# Patient Record
(demographics unavailable — no encounter records)

---

## 2025-05-08 NOTE — PHYSICAL EXAM
[Well Developed] : well developed [No Acute Distress] : no acute distress [No Carotid Bruit] : no carotid bruit [Normal S1, S2] : normal S1, S2 [No Murmur] : no murmur [Clear Lung Fields] : clear lung fields [No Respiratory Distress] : no respiratory distress  [Normal Gait] : normal gait [No Edema] : no edema [Normal Radial B/L] : normal radial B/L [Moves all extremities] : moves all extremities [No Focal Deficits] : no focal deficits [Alert and Oriented] : alert and oriented

## 2025-05-08 NOTE — DISCUSSION/SUMMARY
[FreeTextEntry1] : OLGA DAVE is a 66 year old M who presents today May 08, 2025 with the above history and the following active issues:   Dyspnea: Recommend updating Echocardiogram for evaluation of cardiac anatomy and function including resting heart structure, valvular function, and LVEF.  Recommend Obtain exercise stress test to assess BP response to exercise, exercise induced arrhythmias, or evidence of ischemia.  F/u after testing.  Borderline HTN: Mildly elevated. ETT will be helpful for BP response to exercise.  HLD: LDL 96 in 9/2024. Compliant with Crestor.  Recommend CT Calcium score for risk stratification.  Updated fasting lab work has been requested. He will call for results.  F/u after above testing or sooner PRN.  Red flag symptoms which would warrant sooner emergent evaluation reviewed with the patient. All questions and concerns were addressed and answered.   Sincerely,  Nola Reyes, [facility] Patient's history, testing, medications and any relative changes in plan of care reviewed with supervising MD: Dr. Aba Villeda

## 2025-05-08 NOTE — CARDIOLOGY SUMMARY
[de-identified] : 11/2022 ETT Exercised 11mins 15sec on Temo protocol. Negative for ischemia.  [de-identified] : 11/2022 Normal left ventricular size, wall thickness, wall motion, and systolic function. Normal diastolic function.  [de-identified] : Labs 9/2024 LDL 96, HDL 58, Trigs 120.

## 2025-05-08 NOTE — HISTORY OF PRESENT ILLNESS
[FreeTextEntry1] : Zev is a 66yoM with PMHx: HLD, pre-DM, transient AST elevation, diverticulitis.  He was last seen 9/2024 feeling well, active without exertional sx.   He presents in clinic today for routine interval follow up. Since last seen he went on trip to [place], hiking, some DIEGO/SOB during that. No associated sx.  He remains active, exercising regularly, walking.   There is no exertional chest pain, pressure or discomfort. There is no LE edema, PND or orthopnea. There are no symptomatic palpitations, lightheadedness, dizziness or syncope.   BP is borderline at 134/82. Weight is stable. BMI 19.69kg/m2.   No recent lab work available for my review.  Prior testing outlined in cardiology summary.

## 2025-06-09 NOTE — DISCUSSION/SUMMARY
[FreeTextEntry1] : OLGA DAVE is a 66 year old M who presents today Jun 03, 2025 with the above history and the following active issues:  DIEGO. [place] ETT. Echo and CT calcium score with the above findings. Advised to f/u with PCP re: noncardiac findings. Recommend coronary CTA to r/o CAD. Denies allergy to IV dye/contrastst. Cr 0.69 on labs 5/9/25. On ASA/statin. F/u after to review.  F/u after testing.  Borderline HTN: /68 today.  HLD: on statin.   Ongoing f/u with PCP.  F/U after testing to review results. Discussed red flag symptoms, which would warrant sooner or emergent medical evaluation. Any questions and concerns were addressed and resolved.  Sincerely, Chelsi Sharp [facility] Patient's history, testing, and plan was reviewed with supervising physician, Dr. Cal Ross

## 2025-06-09 NOTE — HISTORY OF PRESENT ILLNESS
[FreeTextEntry1] : OLGA DAVE is a 66 year old male with a past medical history of  HLD, pre-DM, transient AST elevation, diverticulitis.  Last seen 5/8/25. DIEGO reported. CT calcium score, Echo, ETT, and labs ordered. See details below. In the interim there have been no hospitalizations or procedures. Reports DIEGO. Denies CP, palpitations, dizziness, lightheadedness, syncope, claudication, and edema. No smoking hx. Works as a lynn.  Testing:  ETT 6/3/25: Conclusions: 1. The ECG is nondiagnostic for Ischemia due to failure to achieve 85% maximum predicted heart rate. 2. Electrocardiogram ischemic changes: No evidence of exercise induced ischemia at sub max HR.  Echo 5/30/25: CONCLUSIONS: 1. The left ventricular diastolic function is indeterminate. 2. Mildly enlarged right ventricular cavity size, with normal wall thickness, and normal right ventricular  systolic function. 3. Normal left and right atrial size. 4. Mild mitral regurgitation. 5. No echocardiographic evidence of pulmonary hypertension. 6. No pericardial effusion seen. 7. Aortic root at the sinuses of Valsalva is dilated, measuring 3.50 cm (indexed 2.16 cm/m). Ascending  aorta is dilated, measuring 4.00 cm (indexed 2.47 cm/m). 8. Mild pulmonic regurgitation. 9. Trace tricuspid regurgitation. 10. LV EF is low normal, visually estimated to be 50-55%. 11. Compared to the transthoracic echocardiogram performed on 11/3/2022, low normal LV EF, mildly  dilated RV and dilated ascending aorta is seen.  CT calcium score 5/12/25: Dilattion of the ascending thoracic aorta measuring up to 4 cm in diabeter. SPine findings which can be seen with DISH. Calcium score 21.  Labs 5/9/25: Mag 2.1, BNP 33, TSH 2.2, WBC 4.89, hgb 13.4, HCT37.1, plt 250, Na 143, K 4.5, Cr 0.69, Ca 8.6, AST 35, ALT 30, Trigs 45, LDL 92, CHol 165, A1C 5.6, PSA 0.24,   Stress Test: 11/2022 ETT Exercised 11mins 15sec on Temo protocol. Negative for ischemia.   Echo: 11/2022 Normal left ventricular size, wall thickness, wall motion, and systolic function. Normal diastolic function.   Other: Labs 9/2024 LDL 96, HDL 58, Trigs 120.

## 2025-06-09 NOTE — DISCUSSION/SUMMARY
[FreeTextEntry1] : OLGA DAVE is a 66 year old M who presents today Jun 03, 2025 with the above history and the following active issues:  DIEGO. [place] ETT. Echo and CT calcium score with the above findings. Advised to f/u with PCP re: noncardiac findings. Recommend coronary CTA to r/o CAD. Denies allergy to IV dye/contrastst. Cr 0.69 on labs 5/9/25. On ASA/statin. F/u after to review.  F/u after testing.  Borderline HTN: /68 today.  HLD: on statin.   Ongoing f/u with PCP.  F/U after testing to review results. Discussed red flag symptoms, which would warrant sooner or emergent medical evaluation. Any questions and concerns were addressed and resolved.  Sincerely, Chelsi Sharp [facility] Patient's history, testing, and plan was reviewed with supervising physician, Dr. Cal Ross

## 2025-07-07 NOTE — HISTORY OF PRESENT ILLNESS
[FreeTextEntry1] : OLGA DAVE is a 67 year old male with a past medical history of  HLD, pre-DM, transient AST elevation, diverticulitis.  Reports DIEGO. Denies CP, palpitations, dizziness, lightheadedness, syncope, claudication, and edema. No smoking hx. Works as a lynn.  Testing:  ETT 6/3/25: Conclusions: 1. The ECG is nondiagnostic for Ischemia due to failure to achieve 85% maximum predicted heart rate. 2. No evidence of exercise induced ischemia at sub max HR.  Echo 5/30/25: CONCLUSIONS: 1. The left ventricular diastolic function is indeterminate. 2. Mildly enlarged right ventricular cavity size, with normal wall thickness, and normal right ventricular  systolic function. 3. Normal left and right atrial size. 4. No echocardiographic evidence of pulmonary hypertension. 5. Ascending aorta is dilated, measuring 4.00 cm (indexed 2.47 cm/m). 6. LV EF is low normal, visually estimated to be 50-55%. Compared to the transthoracic echocardiogram performed on 11/3/2022, low normal LV EF, mildly  dilated RV and dilated ascending aorta is seen.  CT calcium score 5/12/25: Dilattion of the ascending thoracic aorta measuring up to 4 cm in diabeter. Spine findings which can be seen with DISH. Calcium score 21.  CTA: Mild luminal narrowing proximal LAD.  Labs 5/9/25: Mag 2.1, BNP 33, TSH 2.2, WBC 4.89, hgb 13.4, HCT37.1, plt 250, Na 143, K 4.5, Cr 0.69, Ca 8.6, AST 35, ALT 30, Trigs 45, LDL 92, CHol 165, A1C 5.6, PSA 0.24,     Stress Test: 11/2022 ETT Exercised 11mins 15sec on Temo protocol. Negative for ischemia.   Echo: 11/2022 Normal left ventricular size, wall thickness, wall motion, and systolic function. Normal diastolic function.   Other: Labs 9/2024 LDL 96, HDL 58, Trigs 120.

## 2025-07-07 NOTE — PHYSICAL EXAM
[Well Nourished] : well nourished [No Acute Distress] : no acute distress [Normal Conjunctiva] : normal conjunctiva [Normal Venous Pressure] : normal venous pressure [No Carotid Bruit] : no carotid bruit [Normal S1, S2] : normal S1, S2 [No Murmur] : no murmur [No Rub] : no rub [No Gallop] : no gallop [Clear Lung Fields] : clear lung fields [Good Air Entry] : good air entry [No Respiratory Distress] : no respiratory distress  [Soft] : abdomen soft [Non Tender] : non-tender [No Masses/organomegaly] : no masses/organomegaly [Normal Bowel Sounds] : normal bowel sounds [Normal Gait] : normal gait [No Edema] : no edema [No Cyanosis] : no cyanosis [No Clubbing] : no clubbing [No Varicosities] : no varicosities [No Rash] : no rash [Moves all extremities] : moves all extremities [No Focal Deficits] : no focal deficits [Normal Speech] : normal speech [Alert and Oriented] : alert and oriented [Normal memory] : normal memory

## 2025-07-07 NOTE — DISCUSSION/SUMMARY
[FreeTextEntry1] : OLGA DAVE is a 66 year old M   DIEGO.  Mild and stable.  most likely related to physical conditioning. Echo and CT calcium score with the above findings.  Minimal luminal narrowing in proximal LAD by CTA.  No significant disease. On ASA/statin. Borderline HTN:  over 70 mmHg.  Previously blood pressures have been adequate. HLD: on statin.  Target LDL less than 70.  LDL was 92 in May 2025.  I had extensive discussion with patient regarding diet change.  He would prefer that in place of Zetia for now.  Recheck labs in 3 months.  If LDL is still elevated, he is agreeable to Zetia.  Ongoing f/u with PCP. Thank you for this referral and allowing me to participate in the care of this patient.  If I can be of any further help or  if you have any questions, please do not hesitate to contact me  Sincerely, Aba Villeda MD, FACC, ALLAN